# Patient Record
Sex: MALE | Race: WHITE | NOT HISPANIC OR LATINO | Employment: OTHER | URBAN - METROPOLITAN AREA
[De-identification: names, ages, dates, MRNs, and addresses within clinical notes are randomized per-mention and may not be internally consistent; named-entity substitution may affect disease eponyms.]

---

## 2017-04-30 ENCOUNTER — HOSPITAL ENCOUNTER (EMERGENCY)
Facility: HOSPITAL | Age: 32
Discharge: HOME | End: 2017-04-30
Attending: EMERGENCY MEDICINE
Payer: MEDICAID

## 2017-04-30 VITALS
TEMPERATURE: 98 F | OXYGEN SATURATION: 100 % | DIASTOLIC BLOOD PRESSURE: 89 MMHG | RESPIRATION RATE: 16 BRPM | HEART RATE: 101 BPM | WEIGHT: 230 LBS | BODY MASS INDEX: 30.48 KG/M2 | HEIGHT: 73 IN | SYSTOLIC BLOOD PRESSURE: 148 MMHG

## 2017-04-30 DIAGNOSIS — L08.9 WOUND INFECTION: Primary | ICD-10-CM

## 2017-04-30 DIAGNOSIS — T14.8XXA WOUND INFECTION: Primary | ICD-10-CM

## 2017-04-30 PROCEDURE — 96372 THER/PROPH/DIAG INJ SC/IM: CPT

## 2017-04-30 PROCEDURE — 25000003 PHARM REV CODE 250: Performed by: EMERGENCY MEDICINE

## 2017-04-30 PROCEDURE — 99283 EMERGENCY DEPT VISIT LOW MDM: CPT | Mod: 25

## 2017-04-30 RX ORDER — CLINDAMYCIN PHOSPHATE 150 MG/ML
600 INJECTION, SOLUTION INTRAVENOUS
Status: COMPLETED | OUTPATIENT
Start: 2017-04-30 | End: 2017-04-30

## 2017-04-30 RX ORDER — SULFAMETHOXAZOLE AND TRIMETHOPRIM 800; 160 MG/1; MG/1
1 TABLET ORAL 2 TIMES DAILY
Qty: 14 TABLET | Refills: 0 | Status: SHIPPED | OUTPATIENT
Start: 2017-04-30 | End: 2017-05-07

## 2017-04-30 RX ORDER — ENOXAPARIN SODIUM 150 MG/ML
1 INJECTION SUBCUTANEOUS
COMMUNITY
End: 2017-06-07

## 2017-04-30 RX ADMIN — CLINDAMYCIN PHOSPHATE 600 MG: 150 INJECTION, SOLUTION INTRAMUSCULAR; INTRAVENOUS at 12:04
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
## 2017-04-30 NOTE — DISCHARGE INSTRUCTIONS
Wound Check, Infection  You have a wound that has become infected. The wound will not heal properly unless the infection is cleared. Infection in a wound may also spread if it is not treated. In most cases, antibiotic medicines are prescribed to treat a wound infection.   Symptoms of a wound infection include:  · Redness or swelling around the wound  · Warmth coming from the wound  · New or worsening pain  · Red streaks around the wound  · Draining pus  · Fever  Home care  Follow all directions you are given to treat the infection.  Medicines  Take all medicines as prescribed.   · If you were given antibiotics, take them until they are gone or your healthcare provider tells you to stop. It is vital to finish the antibiotics even if you feel better. If you do not finish them, the infection may come back and be harder to treat.  · If your infection is not responding to the medicines you are taking, you may be prescribed new medicines.  · Take medicine for pain as directed by your healthcare provider.  Wound care  Care for your wound as directed by your healthcare provider.  · Apply a warm compress (clean cloth soaked in hot water) to the infected area for about 5 to 10 minutes at a time. Be very careful not to burn yourself. Test the cloth on a non-infected area to make sure it is not too hot.  · Continue to change the dressing daily. If it becomes wet, stained with wound fluid, or dirty, change it sooner. To change it:  ¨ Wash your hands with soap and water before changing the dressing.  ¨ Carefully remove the dressing and tape. If it sticks to the wound, you may need to wet it a little to remove it. (Do not do this if your healthcare provider has told you not to.)  ¨ Gently clean the wound with clean water (or saline) using gauze, a clean washcloth, or cotton swab.  ¨ Do not use soap, alcohol, peroxide or other cleansers.  ¨ If you were told to dry the wound before putting on a new dressing, gently pat. Do not  rub.  ¨ Throw out the old dressing.  ¨ Wash your hands again before opening the new, clean dressing.  ¨ Wash your hands again when you are done.  Follow-up care  Follow up with your healthcare provider as advised. If a culture was done, you will be notified if your treatment needs to change. Call as directed for the results.  When to seek medical advice  Call your health care provider right away if any of these occur:  · Symptoms of infection don't start to improve within 2 days of starting antibiotics  · Symptoms of infection get worse  · New symptoms, such as red streaks around the wound  · Fever of 100.4°F (38.0°C) or higher for more than 2 days after starting the antibiotics  Date Last Reviewed: 8/10/2015  © 6376-6745 [employer]. [address] [place] [address], [place] [address]. All rights reserved. This information is not intended as a substitute for professional medical care. Always follow your healthcare professional's instructions.

## 2017-04-30 NOTE — ED AVS SNAPSHOT
[facility] [address] [place] [address]               Hernandez England   2017 12:15 PM   ED    Description:  Male : 1985   Department:  [facility]           Your Care was Coordinated By:     Provider Role From To    Bertrand Cordon MD Attending Provider 17 1217 --      Reason for Visit     Wound Check           Diagnoses this Visit        Comments    Wound infection    -  Primary       ED Disposition     None           To Do List           Follow-up Information     Please follow up.    Why:  [facility] hospital wound care        Follow up with [facility].    Specialty:  Emergency Medicine    Why:  As needed, If symptoms worsen    Contact information:    [address] [place] [address] [phone]        Schedule an appointment as soon as possible for a visit with [facility] - EMERGENCY.    Why:  orthopedics    Contact information:     [facility] [address] [phone]       These Medications        Disp Refills Start End    sulfamethoxazole-trimethoprim 800-160mg (BACTRIM DS) 800-160 mg Tab 14 tablet 0 2017    Take 1 tablet by mouth 2 (two) times daily. - Oral      [facility] On [facility]     [facility] On Call Nurse Care Line -  Assistance  Unless otherwise directed by your provider, please contact [facility], our nurse care line that is available for  assistance.     Registered nurses in the [facility] provide: appointment scheduling, clinical advisement, health education, and other advisory services.  Call: [phone] (toll free)               Medications           Message regarding Medications     Verify the changes and/or additions to your medication regime listed below are the same as discussed with your clinician today.  If any of these changes or additions are incorrect, please notify your  "healthcare provider.        START taking these NEW medications        Refills    sulfamethoxazole-trimethoprim 800-160mg (BACTRIM DS) 800-160 mg Tab 0    Sig: Take 1 tablet by mouth 2 (two) times daily.    Class: Print    Route: Oral      These medications were administered today        Dose Freq    clindamycin injection 600 mg 600 mg ED 1 Time    Sig: Inject 4 mLs (600 mg total) into the muscle ED 1 Time.    Class: Normal    Route: Intramuscular           Verify that the below list of medications is an accurate representation of the medications you are currently taking.  If none reported, the list may be blank. If incorrect, please contact your healthcare provider. Carry this list with you in case of emergency.           Current Medications     enoxaparin (LOVENOX) 150 mg/mL Syrg Inject 1 mg/kg into the skin every 12 (twelve) hours.    chlordiazepoxide (LIBRIUM) 25 MG Cap Take 2 capsules (50 mg total) by mouth 4 (four) times daily as needed. Take 100 mg initially then take 50 mg as needed by mouth 4 times a day for withdrawal symptoms    hyoscyamine (ANASPAZ,LEVSIN) 0.125 mg Tab Take 1 tablet (125 mcg total) by mouth every 4 (four) hours as needed (Abdominal cramping).    ondansetron (ZOFRAN-ODT) 4 MG TbDL Take 1 tablet (4 mg total) by mouth every 8 (eight) hours as needed.    sulfamethoxazole-trimethoprim 800-160mg (BACTRIM DS) 800-160 mg Tab Take 1 tablet by mouth 2 (two) times daily.           Clinical Reference Information           Your Vitals Were     BP Pulse Temp Resp Height Weight    148/89 (BP Location: Right arm, Patient Position: Sitting) 101 97.7 °F (36.5 °C) (Oral) 16 6' 1" (1.854 m) 104.3 kg (230 lb)    SpO2 BMI             100% 30.34 kg/m2         Allergies as of 4/30/2017     No Known Allergies      Immunizations Administered on Date of Encounter - 4/30/2017     None      ED Micro, Lab, POCT     None      ED Imaging Orders     None        Discharge Instructions         Wound Check, Infection  You " have a wound that has become infected. The wound will not heal properly unless the infection is cleared. Infection in a wound may also spread if it is not treated. In most cases, antibiotic medicines are prescribed to treat a wound infection.   Symptoms of a wound infection include:  · Redness or swelling around the wound  · Warmth coming from the wound  · New or worsening pain  · Red streaks around the wound  · Draining pus  · Fever  Home care  Follow all directions you are given to treat the infection.  Medicines  Take all medicines as prescribed.   · If you were given antibiotics, take them until they are gone or your healthcare provider tells you to stop. It is vital to finish the antibiotics even if you feel better. If you do not finish them, the infection may come back and be harder to treat.  · If your infection is not responding to the medicines you are taking, you may be prescribed new medicines.  · Take medicine for pain as directed by your healthcare provider.  Wound care  Care for your wound as directed by your healthcare provider.  · Apply a warm compress (clean cloth soaked in hot water) to the infected area for about 5 to 10 minutes at a time. Be very careful not to burn yourself. Test the cloth on a non-infected area to make sure it is not too hot.  · Continue to change the dressing daily. If it becomes wet, stained with wound fluid, or dirty, change it sooner. To change it:  ¨ Wash your hands with soap and water before changing the dressing.  ¨ Carefully remove the dressing and tape. If it sticks to the wound, you may need to wet it a little to remove it. (Do not do this if your healthcare provider has told you not to.)  ¨ Gently clean the wound with clean water (or saline) using gauze, a clean washcloth, or cotton swab.  ¨ Do not use soap, alcohol, peroxide or other cleansers.  ¨ If you were told to dry the wound before putting on a new dressing, gently pat. Do not rub.  ¨ Throw out the old  dressing.  ¨ Wash your hands again before opening the new, clean dressing.  ¨ Wash your hands again when you are done.  Follow-up care  Follow up with your healthcare provider as advised. If a culture was done, you will be notified if your treatment needs to change. Call as directed for the results.  When to seek medical advice  Call your health care provider right away if any of these occur:  · Symptoms of infection don't start to improve within 2 days of starting antibiotics  · Symptoms of infection get worse  · New symptoms, such as red streaks around the wound  · Fever of 100.4°F (38.0°C) or higher for more than 2 days after starting the antibiotics  Date Last Reviewed: 8/10/2015  © 1074-2770 [employer]. [address] [place] [address], [place] [address]. All rights reserved. This information is not intended as a substitute for professional medical care. Always follow your healthcare professional's instructions.          [facility] Sign-Up     Activating your [facility] account is as easy as 1-2-3!     1) Visit [email or web address], select Sign Up Now, enter this activation code and your date of birth, then select Next.  [email or web address]  Expires: 6/14/2017  2:00 PM      2) Create a username and password to use when you visit [facility] in the future and select a security question in case you lose your password and select Next.    3) Enter your e-mail address and click Sign Up!    Additional Information  If you have questions, please e-mail [email or web address] or call [phone] to talk to our [facility] staff. Remember, [facility] is NOT to be used for urgent needs. For medical emergencies, dial 911.         Smoking Cessation     If you would like to quit smoking:   You may be eligible for free services if you are a [place] resident and started smoking cigarettes before September 1, 1988.  Call the Smoking Cessation Trust (SCT) toll free at (273) 183-2852 or (640) 555-7673.   Call 2-052-QUIT-NOW if you do  not meet the above criteria.   Contact us via email: [email or web address]   View our website for more information: [email or web address]         [facility] complies with applicable Federal civil rights laws and does not discriminate on the basis of race, color, national origin, age, disability, or sex.        Language Assistance Services     ATTENTION: Language assistance services are available, free of charge. Please call [phone].      ATENCIÓN: Si habla español, tiene a vera disposición servicios gratuitos de asistencia lingüística. Llame al [phone].     CHÚ Ý: N?u b?n nói Ti?ng Vi?t, có các d?ch v? h? tr? ngôn ng? mi?n phí dành cho b?n. G?i s? [phone].

## 2017-06-07 ENCOUNTER — HOSPITAL ENCOUNTER (EMERGENCY)
Facility: HOSPITAL | Age: 32
Discharge: HOME | End: 2017-06-07
Attending: EMERGENCY MEDICINE
Payer: MEDICAID

## 2017-06-07 VITALS
HEART RATE: 79 BPM | SYSTOLIC BLOOD PRESSURE: 140 MMHG | RESPIRATION RATE: 18 BRPM | HEIGHT: 73 IN | DIASTOLIC BLOOD PRESSURE: 70 MMHG | TEMPERATURE: 98 F | WEIGHT: 230 LBS | OXYGEN SATURATION: 98 % | BODY MASS INDEX: 30.48 KG/M2

## 2017-06-07 DIAGNOSIS — L02.415 ABSCESS OF RIGHT LEG: Primary | ICD-10-CM

## 2017-06-07 PROCEDURE — 25000003 PHARM REV CODE 250: Performed by: PHYSICIAN ASSISTANT

## 2017-06-07 PROCEDURE — 99283 EMERGENCY DEPT VISIT LOW MDM: CPT | Mod: 25

## 2017-06-07 PROCEDURE — 10061 I&D ABSCESS COMP/MULTIPLE: CPT

## 2017-06-07 RX ORDER — SULFAMETHOXAZOLE AND TRIMETHOPRIM 800; 160 MG/1; MG/1
1 TABLET ORAL 2 TIMES DAILY
Qty: 14 TABLET | Refills: 0 | Status: SHIPPED | OUTPATIENT
Start: 2017-06-07 | End: 2017-06-09 | Stop reason: SDUPTHER

## 2017-06-07 RX ORDER — LIDOCAINE HYDROCHLORIDE 10 MG/ML
10 INJECTION INFILTRATION; PERINEURAL
Status: COMPLETED | OUTPATIENT
Start: 2017-06-07 | End: 2017-06-07

## 2017-06-07 RX ORDER — SULFAMETHOXAZOLE AND TRIMETHOPRIM 800; 160 MG/1; MG/1
1 TABLET ORAL
Status: COMPLETED | OUTPATIENT
Start: 2017-06-07 | End: 2017-06-07

## 2017-06-07 RX ORDER — BUPRENORPHINE AND NALOXONE 8; 2 MG/1; MG/1
FILM, SOLUBLE BUCCAL; SUBLINGUAL
COMMUNITY
End: 2018-09-08

## 2017-06-07 RX ADMIN — LIDOCAINE HYDROCHLORIDE 10 ML: 10 INJECTION, SOLUTION INFILTRATION; PERINEURAL at 09:06

## 2017-06-07 RX ADMIN — SULFAMETHOXAZOLE AND TRIMETHOPRIM 1 TABLET: 800; 160 TABLET ORAL at 10:06

## 2017-06-08 NOTE — ED PROVIDER NOTES
"Encounter Date: 6/7/2017       History     Chief Complaint   Patient presents with    Abscess     side of right shin-outer lateral     Review of patient's allergies indicates:  No Known Allergies  Patient is a 31 year old male who presents for abscess to right lower extremity. He reports PMH significant for GERD and opiate addiction. He reports he noticed a "possible bite" to the right lower extremity. He reports mild drainage yesterday. He reports worsening redness and pain. He reports the pain is moderate and constant. He denied fever or chills.       The history is provided by the patient and the spouse.     Past Medical History:   Diagnosis Date    GERD (gastroesophageal reflux disease)     Opiate addiction      History reviewed. No pertinent surgical history.  History reviewed. No pertinent family history.  Social History   Substance Use Topics    Smoking status: Current Every Day Smoker     Packs/day: 1.00    Smokeless tobacco: Not on file    Alcohol use Yes     Review of Systems   Constitutional: Negative for chills and fever.   HENT: Negative for congestion and sore throat.    Respiratory: Negative for shortness of breath.    Cardiovascular: Negative for chest pain.   Gastrointestinal: Negative for abdominal pain, diarrhea, nausea and vomiting.   Genitourinary: Negative for dysuria.   Musculoskeletal: Negative for back pain.   Skin: Positive for wound. Negative for rash.   Neurological: Negative for weakness.   Hematological: Does not bruise/bleed easily.       Physical Exam     Initial Vitals [06/07/17 2114]   BP Pulse Resp Temp SpO2   (!) 145/91 84 16 98.1 °F (36.7 °C) 98 %     Physical Exam    Nursing note and vitals reviewed.  Constitutional: He appears well-developed and well-nourished.   HENT:   Head: Normocephalic and atraumatic.   Right Ear: External ear normal.   Left Ear: External ear normal.   Nose: Nose normal.   Eyes: Conjunctivae are normal. Right eye exhibits no discharge. Left eye " exhibits no discharge. No scleral icterus.   Neck: Normal range of motion. Neck supple.   Cardiovascular: Normal rate, regular rhythm and normal heart sounds. Exam reveals no gallop and no friction rub.    No murmur heard.  Pulmonary/Chest: Breath sounds normal. He has no wheezes. He has no rhonchi. He has no rales.   Abdominal: Soft. Bowel sounds are normal. He exhibits no distension. There is no tenderness.   Musculoskeletal: Normal range of motion. He exhibits no tenderness.   Neurological: He is oriented to person, place, and time.   Skin: Skin is warm and dry. Abscess noted. There is erythema.              ED Course   I & D - Incision and Drainage  Date/Time: 6/8/2017 1:29 AM  Performed by: WALLACE BEACH  Authorized by: PRINCESS SLOAN   Type: abscess  Body area: lower extremity  Location details: right leg  Anesthesia: local infiltration    Anesthesia:  Anesthesia: local infiltration  Local Anesthetic: lidocaine 1% without epinephrine   Anesthetic total: 2 mL  Scalpel size: 11  Incision type: single straight  Complexity: complex  Drainage: bloody  Drainage amount: scant  Wound treatment: incision and  wound packed  Packing material: 1/4 in gauze  Patient tolerance: Patient tolerated the procedure well with no immediate complications        Labs Reviewed - No data to display          Medical Decision Making:   History:   I obtained history from: someone other than patient.  Old Medical Records: I decided to obtain old medical records.       APC / Resident Notes:   This is an emergent evaluation of a 31 year old male with complaint of abscess to right lower extremity. Patient is noted to have a 2cm x 2cm area of induration, erythema and tenderness. Patient denied fever. I&D performed, see procedure note. Patient tolerated well. Patient was given instructions on wound care. Antibiotics given. Follow up with primary care provider. Return precautions given. All questions answered. Case was  discussed with Dr. Carlton who is in agreement with the plan of care.            Attending Attestation:     Physician Attestation Statement for NP/PA:   I discussed this assessment and plan of this patient with the NP/PA, but I did not personally examine the patient. The face to face encounter was performed by the NP/PA.    Other NP/PA Attestation Additions:    History of Present Illness: 31-year-old male presented with a chief complaint of an abscess of the right lower leg.    Medical Decision Making: Initial differential diagnosis included but not limited to abscess, dermatitis, and cellulitis.  I am in agreement with the physician assistant's  assessment, treatment, and plan of care.                 ED Course     Clinical Impression:   The encounter diagnosis was Abscess of right leg.          Rosangela Dai PA-C  06/08/17 0131       Edmond Carlton MD  06/08/17 0135

## 2017-06-08 NOTE — ED NOTES
"Pt presents with a possible "spider bite," to the R lateral posterior calf, that was a small bug bite yesterday that is now red and inflamed from opening earlier in the shower Scant serosanguinous drainage. No red streaks visible. Pt denies fever, numbness or tingling to RLE.   "

## 2017-06-09 ENCOUNTER — HOSPITAL ENCOUNTER (EMERGENCY)
Facility: HOSPITAL | Age: 32
Discharge: HOME | End: 2017-06-10
Attending: EMERGENCY MEDICINE
Payer: MEDICAID

## 2017-06-09 VITALS
OXYGEN SATURATION: 98 % | DIASTOLIC BLOOD PRESSURE: 89 MMHG | HEART RATE: 89 BPM | WEIGHT: 240 LBS | BODY MASS INDEX: 31.81 KG/M2 | TEMPERATURE: 98 F | RESPIRATION RATE: 16 BRPM | HEIGHT: 73 IN | SYSTOLIC BLOOD PRESSURE: 142 MMHG

## 2017-06-09 DIAGNOSIS — L03.115 CELLULITIS OF RIGHT LEG: Primary | ICD-10-CM

## 2017-06-09 PROCEDURE — 25000003 PHARM REV CODE 250: Performed by: EMERGENCY MEDICINE

## 2017-06-09 PROCEDURE — 99283 EMERGENCY DEPT VISIT LOW MDM: CPT

## 2017-06-09 RX ORDER — DOXYCYCLINE HYCLATE 100 MG
100 TABLET ORAL
Status: COMPLETED | OUTPATIENT
Start: 2017-06-09 | End: 2017-06-09

## 2017-06-09 RX ORDER — DOXYCYCLINE 100 MG/1
100 CAPSULE ORAL 2 TIMES DAILY
Qty: 20 CAPSULE | Refills: 0 | Status: SHIPPED | OUTPATIENT
Start: 2017-06-09 | End: 2017-06-19

## 2017-06-09 RX ADMIN — DOXYCYCLINE HYCLATE 100 MG: 100 TABLET, COATED ORAL at 11:06

## 2017-06-10 NOTE — ED PROVIDER NOTES
Encounter Date: 6/9/2017       History     Chief Complaint   Patient presents with    Abscess     self removed packing today, reports worsening in the swelling and only removed a small piece of packing, unsure if it broke, dug aroud inside wound with tweezers also prior to arrival     Review of patient's allergies indicates:  No Known Allergies  31-year-old male recently seen here for an abscess presents for concern that he has a retained packing in his wound.  Reports increasing redness around the recently drained abscess.  He is compliant with his Bactrim.          Past Medical History:   Diagnosis Date    GERD (gastroesophageal reflux disease)     Opiate addiction      History reviewed. No pertinent surgical history.  History reviewed. No pertinent family history.  Social History   Substance Use Topics    Smoking status: Current Every Day Smoker     Packs/day: 1.00    Smokeless tobacco: Not on file    Alcohol use Yes     Review of Systems   Constitutional: Negative for fever.   Gastrointestinal: Negative for nausea.   Skin: Positive for wound.       Physical Exam     Initial Vitals [06/09/17 2256]   BP Pulse Resp Temp SpO2   (!) 142/89 89 16 98.2 °F (36.8 °C) 98 %     Physical Exam    Nursing note and vitals reviewed.  Constitutional: He appears well-developed and well-nourished. He is not diaphoretic.  Non-toxic appearance. He does not have a sickly appearance. He does not appear ill. No distress.   HENT:   Head: Normocephalic and atraumatic.   Eyes: EOM are normal.   Neck: Normal range of motion. Neck supple. Normal range of motion present. No no neck rigidity.   Pulmonary/Chest: No respiratory distress.   Musculoskeletal: Normal range of motion.   Neurological: He is alert and oriented to person, place, and time.   Skin: Skin is warm and dry. No rash noted.   Right lateral calf 3 cm x 3 cm area of erythema and induration with a central incision.  No purulent drainage.  No retained packing.  Bedside  ultrasound demonstrates no abscess and no retained foreign body.   Psychiatric: He has a normal mood and affect. His behavior is normal. Judgment and thought content normal.         ED Course   Procedures  Labs Reviewed - No data to display                            ED Course     Clinical Impression:   The encounter diagnosis was Cellulitis of right leg.        Patient has a cellulitis at the site of his recently drained abscess.  Bedside ultrasound demonstrates no persistent abscess.  No retained packing.  Patient will be changed to doxycycline.       Bertrand Cordon MD  06/10/17 0104

## 2017-11-26 ENCOUNTER — HOSPITAL ENCOUNTER (EMERGENCY)
Facility: HOSPITAL | Age: 32
Discharge: HOME | End: 2017-11-26
Attending: EMERGENCY MEDICINE
Payer: MEDICAID

## 2017-11-26 VITALS
WEIGHT: 230 LBS | HEART RATE: 92 BPM | SYSTOLIC BLOOD PRESSURE: 169 MMHG | DIASTOLIC BLOOD PRESSURE: 97 MMHG | BODY MASS INDEX: 29.52 KG/M2 | OXYGEN SATURATION: 99 % | HEIGHT: 74 IN | RESPIRATION RATE: 14 BRPM | TEMPERATURE: 98 F

## 2017-11-26 DIAGNOSIS — L03.211 CELLULITIS, FACE: Primary | ICD-10-CM

## 2017-11-26 PROCEDURE — 99283 EMERGENCY DEPT VISIT LOW MDM: CPT

## 2017-11-26 PROCEDURE — 25000003 PHARM REV CODE 250: Performed by: EMERGENCY MEDICINE

## 2017-11-26 RX ORDER — SULFAMETHOXAZOLE AND TRIMETHOPRIM 800; 160 MG/1; MG/1
1 TABLET ORAL 2 TIMES DAILY
Qty: 13 TABLET | Refills: 0 | Status: SHIPPED | OUTPATIENT
Start: 2017-11-26 | End: 2017-12-03

## 2017-11-26 RX ORDER — SULFAMETHOXAZOLE AND TRIMETHOPRIM 800; 160 MG/1; MG/1
1 TABLET ORAL
Status: COMPLETED | OUTPATIENT
Start: 2017-11-26 | End: 2017-11-26

## 2017-11-26 RX ADMIN — SULFAMETHOXAZOLE AND TRIMETHOPRIM 1 TABLET: 800; 160 TABLET ORAL at 04:11

## 2017-11-26 NOTE — ED PROVIDER NOTES
Encounter Date: 11/26/2017    SCRIBE #1 NOTE: I, Lynda Moeller, am scribing for, and in the presence of, Dr. Hollins.       History     Chief Complaint   Patient presents with    Nose pain     and swelling. went to bed fine, woke up this morning with swelling to nose and lip       11/26/2017  4:01 PM    Chief Complaint: Nasal Swelling      The patient is a 32 y.o. male who presents with worsening lip and nose swelling x 24 hours with associated sx of pain. Pt reports recent cold-like symptoms. There are no alleviating or exacerbating factors. Pt reports multiple prior ingrown hairs. Denies drainage, injury, drug abuse. NKDA. PMHx of GERD and opiate addiction. No SHx noted.        The history is provided by the patient and the spouse.     Review of patient's allergies indicates:  No Known Allergies  Past Medical History:   Diagnosis Date    GERD (gastroesophageal reflux disease)     Opiate addiction      History reviewed. No pertinent surgical history.  History reviewed. No pertinent family history.  Social History   Substance Use Topics    Smoking status: Current Every Day Smoker     Packs/day: 1.00    Smokeless tobacco: Not on file    Alcohol use Yes     Review of Systems   Constitutional: Negative for fever.   HENT: Negative for sore throat.         Positive for lip and nose swelling and pain.  Negative for drainage.   Respiratory: Negative for shortness of breath.    Cardiovascular: Negative for chest pain.   Gastrointestinal: Negative for nausea.   Genitourinary: Negative for dysuria.   Musculoskeletal: Negative for back pain.   Skin: Negative for rash.   Neurological: Negative for weakness.   Hematological: Does not bruise/bleed easily.       Physical Exam     Initial Vitals [11/26/17 1558]   BP Pulse Resp Temp SpO2   (!) 169/97 92 14 97.8 °F (36.6 °C) 99 %      MAP       121         Physical Exam    Nursing note and vitals reviewed.  Constitutional: He appears well-developed and well-nourished. He is  not diaphoretic. No distress.   HENT:   Head: Normocephalic and atraumatic.   Nose: No mucosal edema.   Mouth/Throat: Oropharynx is clear and moist. No uvula swelling.   Small 2 mm papule at the external aspect of the right naris. No fluctuance. Minimal induration extending toward the philtrum. Midline uvula and normal soft pallet.   Eyes: Conjunctivae are normal.   Neck: Neck supple.   Cardiovascular: Normal rate, regular rhythm, normal heart sounds and intact distal pulses. Exam reveals no gallop and no friction rub.    No murmur heard.  Pulmonary/Chest: Breath sounds normal. He has no wheezes. He has no rhonchi. He has no rales.   Abdominal: Soft. He exhibits no distension. There is no tenderness.   Musculoskeletal: Normal range of motion.   Neurological: He is alert and oriented to person, place, and time.   Skin: No rash noted. No erythema.   Psychiatric: He has a normal mood and affect. His behavior is normal. Judgment and thought content normal.         ED Course   Procedures  Labs Reviewed - No data to display          Medical Decision Making:   History:   Old Medical Records: I decided to obtain old medical records.            Scribe Attestation:   Scribe #1: I performed the above scribed service and the documentation accurately describes the services I performed. I attest to the accuracy of the note.    I, Dr. Lew Hollins, personally performed the services described in this documentation. All medical record entries made by the scribe were at my direction and in my presence.  I have reviewed the chart and agree that the record reflects my personal performance and is accurate and complete. Lew Hollins MD.  4:57 PM 11/26/2017    Hernandez England is a 32 y.o. male presenting with early facial cellulitis starting out as a papule.  There is no significant nasal septal involvement and no sign of abscess.  There is no indication for I and D at this point.  I do not think he requires further imaging or IV  antibiotics.  Bactrim initiated with close return precautions and outpatient PCP follow-up recommended.  I doubt angioedema.          ED Course      Clinical Impression:     1. Cellulitis, face          Disposition:   Disposition: Discharged  Condition: Stable                        Lew Hollins MD  11/26/17 9615

## 2018-09-08 ENCOUNTER — HOSPITAL ENCOUNTER (EMERGENCY)
Facility: HOSPITAL | Age: 33
Discharge: HOME | End: 2018-09-09
Attending: EMERGENCY MEDICINE
Payer: MEDICAID

## 2018-09-08 VITALS
HEIGHT: 74 IN | WEIGHT: 237 LBS | SYSTOLIC BLOOD PRESSURE: 162 MMHG | TEMPERATURE: 97 F | BODY MASS INDEX: 30.42 KG/M2 | OXYGEN SATURATION: 98 % | HEART RATE: 89 BPM | DIASTOLIC BLOOD PRESSURE: 93 MMHG | RESPIRATION RATE: 16 BRPM
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
## 2018-09-08 PROCEDURE — 99283 EMERGENCY DEPT VISIT LOW MDM: CPT

## 2018-09-09 RX ORDER — CEPHALEXIN 250 MG/1
250 CAPSULE ORAL 4 TIMES DAILY
Qty: 28 CAPSULE | Refills: 0 | Status: SHIPPED | OUTPATIENT
Start: 2018-09-09 | End: 2018-09-16

## 2018-09-09 NOTE — ED NOTES
"Patient here with "spider bite" to left lower leg, small, red, tender area to lower leg with central draining area.  "

## 2018-09-09 NOTE — ED PROVIDER NOTES
Encounter Date: 9/8/2018    SCRIBE #1 NOTE: Hui RAMIREZ, am scribing for, and in the presence of, Jackson Myrick MD.       History     Chief Complaint   Patient presents with    Insect Bite       Time seen by provider: 12:26 AM on 09/09/2018    Hernandez England Jr. is a 33 y.o. male with pmhx of GERD and Opioid Addiction who presents to the ED for evaluation of an insect bite to the back of the left calf. The pt first noticed the bite last night. Since onset, the bite has become swollen and drained pus, which the pt drained.   The patient denies fever or any other symptoms at this time. No pertinent SHx noted. Current everyday smoker (1 ppd). NKDA.       The history is provided by the patient.     Review of patient's allergies indicates:  No Known Allergies  Past Medical History:   Diagnosis Date    GERD (gastroesophageal reflux disease)     Opiate addiction      History reviewed. No pertinent surgical history.  History reviewed. No pertinent family history.  Social History     Tobacco Use    Smoking status: Current Every Day Smoker     Packs/day: 1.00    Smokeless tobacco: Former User   Substance Use Topics    Alcohol use: Yes    Drug use: Yes     Comment: Heroin-last use 4 years     Review of Systems   Constitutional: Negative for fever.   HENT: Negative for sore throat.    Respiratory: Negative for shortness of breath.    Cardiovascular: Negative for chest pain.   Gastrointestinal: Negative for nausea.   Genitourinary: Negative for dysuria.   Musculoskeletal: Negative for back pain.   Skin: Negative for rash.        Insect bite to back of left calf.    Neurological: Negative for weakness.   Hematological: Does not bruise/bleed easily.       Physical Exam     Initial Vitals [09/08/18 2314]   BP Pulse Resp Temp SpO2   (!) 162/93 89 16 97.4 °F (36.3 °C) 98 %      MAP       --         Physical Exam    Constitutional: He appears well-developed and well-nourished.  Non-toxic appearance. No  distress.   HENT:   Head: Normocephalic and atraumatic.   Eyes: EOM are normal. Pupils are equal, round, and reactive to light.   Neck: Normal range of motion. Neck supple. No neck rigidity. No JVD present.   Cardiovascular: Normal rate, regular rhythm, normal heart sounds and intact distal pulses. Exam reveals no gallop and no friction rub.    No murmur heard.  Pulmonary/Chest: Breath sounds normal. He has no wheezes. He has no rhonchi. He has no rales.   Abdominal: Soft. Bowel sounds are normal. He exhibits no distension. There is no tenderness. There is no rigidity, no rebound and no guarding.   Musculoskeletal: Normal range of motion.   Neurological: He is alert and oriented to person, place, and time. He has normal strength and normal reflexes. No cranial nerve deficit or sensory deficit. He exhibits normal muscle tone. Coordination normal. GCS eye subscore is 4. GCS verbal subscore is 5. GCS motor subscore is 6.   Skin: Skin is warm and dry. There is erythema.   1cm x 1cm area of erythema and induration noted to back of left lower leg. No pus or fluctuance noted.    Psychiatric: He has a normal mood and affect. His speech is normal and behavior is normal. He is not actively hallucinating.         ED Course   Procedures  Labs Reviewed - No data to display       Imaging Results    None          Medical Decision Making:   Initial Assessment:   33-year-old man presents for insect bite, possible spider bite to his lower extremity.  There is mild erythema without abscess formation.  He does not have any significant cellulitis surrounding the area to require inpatient admission.  I will place him on antibiotic therapy.  He is afebrile well-appearing and nontoxic.  I doubt sepsis.  He is to follow up as needed for worsening symptoms.            Scribe Attestation:   Scribe #1: I performed the above scribed service and the documentation accurately describes the services I performed. I attest to the accuracy of the  note.    I, Ramez Botello, personally performed the services described in this documentation. All medical record entries made by the scribe were at my direction and in my presence.  I have reviewed the chart and agree that the record reflects my personal performance and is accurate and complete. Jackson Myrick MD.  7:19 PM 09/09/2018             Clinical Impression:     1. Insect bite, initial encounter                                   Jackson Myrick MD  09/09/18 1919